# Patient Record
Sex: MALE | Race: WHITE | NOT HISPANIC OR LATINO | Employment: FULL TIME | URBAN - METROPOLITAN AREA
[De-identification: names, ages, dates, MRNs, and addresses within clinical notes are randomized per-mention and may not be internally consistent; named-entity substitution may affect disease eponyms.]

---

## 2018-12-11 ENCOUNTER — OFFICE VISIT CONVERTED (OUTPATIENT)
Dept: CARDIOLOGY | Facility: CLINIC | Age: 53
End: 2018-12-11
Attending: INTERNAL MEDICINE

## 2019-02-25 ENCOUNTER — OFFICE VISIT CONVERTED (OUTPATIENT)
Dept: SURGERY | Facility: CLINIC | Age: 54
End: 2019-02-25
Attending: UROLOGY

## 2019-02-25 ENCOUNTER — CONVERSION ENCOUNTER (OUTPATIENT)
Dept: SURGERY | Facility: CLINIC | Age: 54
End: 2019-02-25

## 2020-07-02 ENCOUNTER — OFFICE VISIT CONVERTED (OUTPATIENT)
Dept: CARDIOLOGY | Facility: CLINIC | Age: 55
End: 2020-07-02
Attending: INTERNAL MEDICINE

## 2021-05-13 NOTE — PROGRESS NOTES
"   Progress Note      Patient Name: Jean Carlos Scruggs   Patient ID: [id number]   Sex: Male   YOB: 1965    Primary Care Provider: Abdiaziz Lord Jr. MD   Referring Provider: Maxine UGALDE    Visit Date: July 2, 2020    Provider: Toño Pillai MD   Location: [facility]   Location Address: [address] [place] [address], [facility]   [place]  [id number]   Location Phone: [phone]          Chief Complaint  · Hypertension   · Diabetes   · Aortic aneurysm       History Of Present Illness  REFERRING CARE PROVIDER: Maxine UGALDE   Jean Carlos Scruggs is a 55 year old /White gentleman with a history of hypertension, diabetes and small aortic aneurysm, who is following up today. No complaints or problems.   PAST MEDICAL HISTORY: Significant for diabetes and hypertension.   FAMILY HISTORY: Positive for diabetes, hypertension and heart disease.   PSYCHOSOCIAL HISTORY: No history of mood changes or depression. He never used alcohol. He previously used tobacco but quit.   CURRENT MEDICATIONS: include Metformin 500 mg daily; Cetirizine 10 mg daily; ASA 81 mg daily; fish oil; HCTZ 12.5 mg daily; Losartan 50 mg daily. The dosage and frequency of the medications were reviewed with the patient.       Review of Systems  · Cardiovascular  o Denies  o : palpitations (fast, fluttering, or skipping beats), swelling (feet, ankles, hands), shortness of breath while walking or lying flat, chest pain or angina pectoris   · Respiratory  o Denies  o : chronic or frequent cough, asthma or wheezing      Vitals  Date Time BP Position Site L\R Cuff Size HR RR TEMP (F) WT  HT  BMI kg/m2 BSA m2 O2 Sat        07/02/2020 08:50 /72 Sitting    60 - R   192lbs 0oz 5'  9\" 28.35 2.06           Physical Examination  · Constitutional  o Appearance  o : Awake, alert, in no acute distress.   · Eyes  o Conjunctivae  o : Normal.  · Ears, Nose, Mouth and Throat  o Oral Cavity  o :   § Oral Mucosa  § : " Normal.  · Neck  o Inspection/Palpation  o : No JVD. Good carotid upstroke. No thyromegaly.  · Respiratory  o Respiratory  o : Good respiratory effort. Clear to percussion and auscultation.  · Cardiovascular  o Heart  o :   § Auscultation of Heart  § : S1, S2 normal. Regular rate and rhythm without murmurs, gallops, or rubs.  o Peripheral Vascular System  o :   § Extremities  § : Good femoral and pedal pulses. No pedal edema.  · Gastrointestinal  o Abdominal Examination  o : Soft. No tenderness or masses felt. No hepatosplenomegaly. Abdominal aorta is not palpable.          Assessment     ASSESSMENT AND PLAN:    1.  Hypertension controlled.  2.  Aortic aneurysm:  Measuring 4.3 cm on last CT.  Plan on repeating next year to evaluate for any changes or       not.    MD Ariella Padron/merlin         This note was transcribed by Stacie Jacobson.  merlin/ariella   The above service was transcribed by Stacie Jacobson, and I attest to the accuracy of the note.  .               Electronically Signed by: Stacie Jacobson-, -Author on July 7, 2020 07:12:44 AM  Electronically Co-signed by: Toño Pillai MD -Reviewer on July 14, 2020 02:14:28 PM

## 2021-05-15 VITALS
DIASTOLIC BLOOD PRESSURE: 72 MMHG | HEIGHT: 69 IN | HEART RATE: 60 BPM | BODY MASS INDEX: 28.44 KG/M2 | WEIGHT: 192 LBS | SYSTOLIC BLOOD PRESSURE: 118 MMHG

## 2021-05-16 VITALS
HEIGHT: 69 IN | HEART RATE: 54 BPM | BODY MASS INDEX: 27.11 KG/M2 | SYSTOLIC BLOOD PRESSURE: 104 MMHG | DIASTOLIC BLOOD PRESSURE: 70 MMHG | WEIGHT: 183 LBS

## 2021-05-16 VITALS — HEIGHT: 67 IN | BODY MASS INDEX: 27.47 KG/M2 | RESPIRATION RATE: 16 BRPM | WEIGHT: 175 LBS

## 2021-05-22 ENCOUNTER — TRANSCRIBE ORDERS (OUTPATIENT)
Dept: ADMINISTRATIVE | Facility: HOSPITAL | Age: 56
End: 2021-05-22

## 2021-05-22 DIAGNOSIS — I71.9 AORTIC ANEURYSM WITHOUT RUPTURE, UNSPECIFIED PORTION OF AORTA (HCC): Primary | ICD-10-CM

## 2021-06-29 DIAGNOSIS — I71.20 THORACIC AORTIC ANEURYSM WITHOUT RUPTURE (HCC): Primary | ICD-10-CM

## 2021-07-01 ENCOUNTER — APPOINTMENT (OUTPATIENT)
Dept: CT IMAGING | Facility: HOSPITAL | Age: 56
End: 2021-07-01

## 2021-07-27 ENCOUNTER — TELEPHONE (OUTPATIENT)
Dept: CARDIOLOGY | Facility: CLINIC | Age: 56
End: 2021-07-27

## 2021-07-27 NOTE — TELEPHONE ENCOUNTER
Palliative Care Patient stated that he has not had the his CT of the chest done yet and has been trying to call scheduling to get it done.

## 2021-08-16 ENCOUNTER — HOSPITAL ENCOUNTER (OUTPATIENT)
Dept: CT IMAGING | Facility: HOSPITAL | Age: 56
Discharge: HOME | End: 2021-08-16
Admitting: NURSE PRACTITIONER

## 2021-08-16 DIAGNOSIS — I71.20 THORACIC AORTIC ANEURYSM WITHOUT RUPTURE (HCC): ICD-10-CM

## 2021-08-16 LAB — CREAT BLDA-MCNC: 1.2 MG/DL

## 2021-08-16 PROCEDURE — 71260 CT THORAX DX C+: CPT

## 2021-08-16 PROCEDURE — 0 IOPAMIDOL PER 1 ML: Performed by: NURSE PRACTITIONER

## 2021-08-16 PROCEDURE — 82565 ASSAY OF CREATININE: CPT

## 2021-08-16 RX ADMIN — IOPAMIDOL 100 ML: 755 INJECTION, SOLUTION INTRAVENOUS at 17:45

## 2021-08-20 ENCOUNTER — TELEPHONE (OUTPATIENT)
Dept: CARDIOLOGY | Facility: CLINIC | Age: 56
End: 2021-08-20

## 2021-08-20 NOTE — TELEPHONE ENCOUNTER
----- Message from AFTAB Dudley sent at 8/16/2021  8:30 PM EDT -----  Notify or aneurysm is stable, now 4.6 cm. Repeat CT in one year

## 2022-08-20 PROBLEM — I71.20 THORACIC AORTIC ANEURYSM WITHOUT RUPTURE (HCC): Status: ACTIVE | Noted: 2022-08-20

## 2022-08-20 PROBLEM — I10 HYPERTENSION: Status: ACTIVE | Noted: 2022-08-20

## 2022-08-23 ENCOUNTER — OFFICE VISIT (OUTPATIENT)
Dept: CARDIOLOGY | Facility: CLINIC | Age: 57
End: 2022-08-23

## 2022-08-23 VITALS
BODY MASS INDEX: 27.88 KG/M2 | DIASTOLIC BLOOD PRESSURE: 79 MMHG | HEART RATE: 74 BPM | WEIGHT: 188.2 LBS | HEIGHT: 69 IN | SYSTOLIC BLOOD PRESSURE: 133 MMHG

## 2022-08-23 DIAGNOSIS — I10 PRIMARY HYPERTENSION: ICD-10-CM

## 2022-08-23 DIAGNOSIS — I71.20 THORACIC AORTIC ANEURYSM WITHOUT RUPTURE: Primary | ICD-10-CM

## 2022-08-23 PROCEDURE — 99214 OFFICE O/P EST MOD 30 MIN: CPT | Performed by: INTERNAL MEDICINE

## 2022-08-23 RX ORDER — CETIRIZINE HYDROCHLORIDE 10 MG/1
10 TABLET ORAL DAILY
COMMUNITY

## 2022-08-23 RX ORDER — MULTIVITAMIN WITH IRON
250 TABLET ORAL DAILY
COMMUNITY

## 2022-08-23 RX ORDER — LOSARTAN POTASSIUM AND HYDROCHLOROTHIAZIDE 12.5; 5 MG/1; MG/1
1 TABLET ORAL DAILY
COMMUNITY

## 2022-08-23 NOTE — ASSESSMENT & PLAN NOTE
Blood pressure well controlled at goal range continue losartan HCTZ 50/12.5 once a day and counseled on a low-sodium diet

## 2022-08-23 NOTE — ASSESSMENT & PLAN NOTE
Blood pressure well controlled at home we will repeat CT scan to evaluate change in size of aneurysm

## 2022-08-23 NOTE — PROGRESS NOTES
"Chief Complaint  Hypertension    Subjective    Patient symptomatically stable during his stay blood pressures run in the 120s at home.  Denies any chest pain or shortness of breath problems    Past Medical History:   Diagnosis Date   • Diabetes (HCC)    • Hypertension          Current Outpatient Medications:   •  cetirizine (zyrTEC) 10 MG tablet, Take 10 mg by mouth Daily., Disp: , Rfl:   •  losartan-hydrochlorothiazide (HYZAAR) 50-12.5 MG per tablet, Take 1 tablet by mouth Daily., Disp: , Rfl:   •  Magnesium 250 MG tablet, Take 250 mg by mouth Daily., Disp: , Rfl:   •  metFORMIN (GLUCOPHAGE) 500 MG tablet, Take 500 mg by mouth Daily., Disp: , Rfl:     There are no discontinued medications.  No Known Allergies     Social History     Tobacco Use   • Smoking status: Never Smoker   • Smokeless tobacco: Current User     Types: Chew   Substance Use Topics   • Alcohol use: Never   • Drug use: Never       Family History   Problem Relation Age of Onset   • Diabetes Other    • Heart disease Other         Objective     /79   Pulse 74   Ht 175.3 cm (69\")   Wt 85.4 kg (188 lb 3.2 oz)   BMI 27.79 kg/m²       Physical Exam    General Appearance:   · no acute distress  · Alert and oriented x3  HENT:   · lips not cyanotic  · Atraumatic  Neck:  · No jvd   · supple  Respiratory:  · no respiratory distress  · normal breath sounds  · no rales  Cardiovascular:  · Regular rate and rhythm  · no S3, no S4   · no murmur  · no rub  Extremities  · No cyanosis  · lower extremity edema: none    Skin:   · warm, dry  · No rashes      Result Review :     No results found for: PROBNP                        Diagnoses and all orders for this visit:    1. Thoracic aortic aneurysm without rupture (HCC) (Primary)  Assessment & Plan:  Blood pressure well controlled at home we will repeat CT scan to evaluate change in size of aneurysm    Orders:  -     CT Chest Without Contrast Diagnostic; Future    2. Primary hypertension  Assessment & " Plan:  Blood pressure well controlled at goal range continue losartan HCTZ 50/12.5 once a day and counseled on a low-sodium diet    Orders:  -     Basic Metabolic Panel; Future          Follow Up     Return in about 58 weeks (around 10/3/2023) for Follow with Ashanti Mann.          Patient was given instructions and counseling regarding his condition or for health maintenance advice. Please see specific information pulled into the AVS if appropriate.

## 2022-09-23 ENCOUNTER — HOSPITAL ENCOUNTER (OUTPATIENT)
Dept: CT IMAGING | Facility: HOSPITAL | Age: 57
Discharge: HOME | End: 2022-09-23
Admitting: INTERNAL MEDICINE

## 2022-09-23 DIAGNOSIS — I71.20 THORACIC AORTIC ANEURYSM WITHOUT RUPTURE: ICD-10-CM

## 2022-09-23 PROCEDURE — 71250 CT THORAX DX C-: CPT

## 2022-09-26 ENCOUNTER — TELEPHONE (OUTPATIENT)
Dept: CARDIOLOGY | Facility: CLINIC | Age: 57
End: 2022-09-26

## 2022-09-26 NOTE — TELEPHONE ENCOUNTER
Informed pt with results and plan. Tried to call his PCP for fax #. They are closed at this time.

## 2022-09-26 NOTE — TELEPHONE ENCOUNTER
----- Message from AFTAB Dudley sent at 9/26/2022 12:32 AM EDT -----  Notify pt CT Chest shows stable aortic measurements, continue to monitor with repeat scan in 1 year. Please forward CT result to PCP for the noncardiac findings so they can be appropriately addressed

## 2024-02-01 ENCOUNTER — OFFICE VISIT (OUTPATIENT)
Dept: CARDIOLOGY | Facility: CLINIC | Age: 59
End: 2024-02-01
Payer: COMMERCIAL

## 2024-02-01 VITALS
DIASTOLIC BLOOD PRESSURE: 75 MMHG | SYSTOLIC BLOOD PRESSURE: 112 MMHG | HEART RATE: 80 BPM | WEIGHT: 194 LBS | BODY MASS INDEX: 28.73 KG/M2 | HEIGHT: 69 IN

## 2024-02-01 DIAGNOSIS — I10 PRIMARY HYPERTENSION: Primary | ICD-10-CM

## 2024-02-01 DIAGNOSIS — I71.21 ANEURYSM OF ASCENDING AORTA WITHOUT RUPTURE: ICD-10-CM

## 2024-02-01 PROBLEM — E11.9 DIABETES: Status: ACTIVE | Noted: 2024-02-01

## 2024-02-01 RX ORDER — TERBINAFINE HYDROCHLORIDE 250 MG/1
TABLET ORAL
COMMUNITY
Start: 2024-01-30

## 2024-02-01 RX ORDER — CYCLOBENZAPRINE HCL 10 MG
10 TABLET ORAL
COMMUNITY
Start: 2023-10-31

## 2024-02-01 RX ORDER — MELOXICAM 15 MG/1
15 TABLET ORAL
COMMUNITY
Start: 2023-11-15

## 2024-02-01 NOTE — PROGRESS NOTES
"Chief Complaint  Follow-up    Subjective            History of Present Illness  Jean Carlos Scruggs is a 58-year-old male patient who presents to the office today for follow-up.  He has hypertension and ascending aortic aneurysm.  He is compliant with medication.  He denies any chest pain, shortness of breath, lightheadedness/dizziness, palpitations, or edema.    PMH  Past Medical History:   Diagnosis Date    Diabetes     Hypertension          ALLERGY  No Known Allergies       SURGICALHX  Past Surgical History:   Procedure Laterality Date    TONSILLECTOMY      VASECTOMY      VASECTOMY REVERSAL            SOC  Social History     Socioeconomic History    Marital status:    Tobacco Use    Smoking status: Never    Smokeless tobacco: Current     Types: Chew   Vaping Use    Vaping Use: Never used   Substance and Sexual Activity    Alcohol use: Never    Drug use: Never         FAMHX  Family History   Problem Relation Age of Onset    Diabetes Other     Heart disease Other           MEDSIGONLY  Current Outpatient Medications on File Prior to Visit   Medication Sig    cetirizine (zyrTEC) 10 MG tablet Take 1 tablet by mouth Daily.    cyclobenzaprine (FLEXERIL) 10 MG tablet 1 tablet.    losartan-hydrochlorothiazide (HYZAAR) 50-12.5 MG per tablet Take 1 tablet by mouth Daily.    Magnesium 250 MG tablet Take 1 tablet by mouth Daily.    meloxicam (MOBIC) 15 MG tablet 1 tablet.    metFORMIN (GLUCOPHAGE) 500 MG tablet Take 1 tablet by mouth Daily.    terbinafine (lamiSIL) 250 MG tablet      No current facility-administered medications on file prior to visit.         Objective   /75   Pulse 80   Ht 175.3 cm (69\")   Wt 88 kg (194 lb)   BMI 28.65 kg/m²       Physical Exam  HENT:      Head: Normocephalic.   Neck:      Vascular: No carotid bruit.   Cardiovascular:      Rate and Rhythm: Normal rate and regular rhythm.      Pulses: Normal pulses.      Heart sounds: Normal heart sounds. No murmur heard.  Pulmonary:      " "Effort: Pulmonary effort is normal.      Breath sounds: Normal breath sounds.   Musculoskeletal:      Cervical back: Neck supple.      Right lower leg: No edema.      Left lower leg: No edema.   Skin:     General: Skin is dry.   Neurological:      Mental Status: He is alert and oriented to person, place, and time.   Psychiatric:         Behavior: Behavior normal.       Result Review :   The following data was reviewed by: AFTAB Roche on 02/01/2024:  No results found for: \"PROBNP\"     No results found for: \"TSH\"   No results found for: \"FREET4\"   No results found for: \"DDIMERQUANT\"  No results found for: \"MG\"   No results found for: \"DIGOXIN\"   No results found for: \"TROPONINT\"               Assessment and Plan    Diagnoses and all orders for this visit:    1. Primary hypertension (Primary)  Currently controlled and without adverse effects from medication, continue losartan/HCTZ 50-12.5 mg daily.  Check BMP and magnesium level to assess electrolytes and renal function.  Check fasting lipid and hepatic function panel to assess need for statin therapy.  -     Basic Metabolic Panel; Future  -     Lipid Panel; Future  -     Hepatic Function Panel; Future  -     Magnesium; Future    2. Aneurysm of ascending aorta without rupture  Last CT chest was 9/23/2022 with ascending thoracic aorta measuring 3.6 cm.  Repeat CT chest September 2024 for further monitoring.  -     CT Chest Without Contrast; Future            Follow Up   Return in about 1 year (around 2/1/2025) for Follow up with Dr Pillai.    Patient was given instructions and counseling regarding his condition or for health maintenance advice. Please see specific information pulled into the AVS if appropriate.     Jean Carlos Scruggs  reports that he has never smoked. His smokeless tobacco use includes chew.           AFTAB Roche  02/01/24  15:11 EST    Dictated Utilizing Dragon Dictation    "

## 2024-08-08 ENCOUNTER — OFFICE VISIT (OUTPATIENT)
Dept: CARDIOLOGY | Facility: CLINIC | Age: 59
End: 2024-08-08
Payer: COMMERCIAL

## 2024-08-08 VITALS
HEART RATE: 73 BPM | WEIGHT: 190 LBS | HEIGHT: 69 IN | DIASTOLIC BLOOD PRESSURE: 64 MMHG | BODY MASS INDEX: 28.14 KG/M2 | SYSTOLIC BLOOD PRESSURE: 112 MMHG

## 2024-08-08 DIAGNOSIS — I10 PRIMARY HYPERTENSION: ICD-10-CM

## 2024-08-08 DIAGNOSIS — I71.21 ANEURYSM OF ASCENDING AORTA WITHOUT RUPTURE: Primary | ICD-10-CM

## 2024-08-08 PROCEDURE — 99213 OFFICE O/P EST LOW 20 MIN: CPT | Performed by: INTERNAL MEDICINE

## 2024-08-08 NOTE — PROGRESS NOTES
"Chief Complaint  Follow-up, Hypertension, and Thoracic aortic aneurysm without rupture    Subjective    Patient with no chest pain problems denies any change in breathing ability  Past Medical History:   Diagnosis Date    Diabetes     Hypertension          Current Outpatient Medications:     cetirizine (zyrTEC) 10 MG tablet, Take 1 tablet by mouth Daily., Disp: , Rfl:     cyclobenzaprine (FLEXERIL) 10 MG tablet, 1 tablet., Disp: , Rfl:     losartan-hydrochlorothiazide (HYZAAR) 50-12.5 MG per tablet, Take 1 tablet by mouth Daily., Disp: , Rfl:     Magnesium 250 MG tablet, Take 1 tablet by mouth Daily., Disp: , Rfl:     meloxicam (MOBIC) 15 MG tablet, 1 tablet., Disp: , Rfl:     metFORMIN (GLUCOPHAGE) 500 MG tablet, Take 1 tablet by mouth Daily., Disp: , Rfl:     terbinafine (lamiSIL) 250 MG tablet, , Disp: , Rfl:     There are no discontinued medications.  No Known Allergies     Social History     Tobacco Use    Smoking status: Never    Smokeless tobacco: Current     Types: Chew   Vaping Use    Vaping status: Never Used   Substance Use Topics    Alcohol use: Never    Drug use: Never       Family History   Problem Relation Age of Onset    Diabetes Other     Heart disease Other         Objective     /64   Pulse 73   Ht 175.3 cm (69\")   Wt 86.2 kg (190 lb)   BMI 28.06 kg/m²       Physical Exam    General Appearance:   no acute distress  Alert and oriented x3  HENT:   lips not cyanotic  Atraumatic  Neck:  No jvd   supple  Respiratory:  no respiratory distress  normal breath sounds  no rales  Cardiovascular:  Regular rate and rhythm  no S3, no S4   no murmur  no rub  Extremities  No cyanosis  lower extremity edema: none    Skin:   warm, dry  No rashes      Result Review :     No results found for: \"PROBNP\"       No results found for: \"TSH\"   No results found for: \"FREET4\"   No results found for: \"DDIMERQUANT\"  No results found for: \"MG\"   No results found for: \"DIGOXIN\"   No results found for: \"TROPONINT\"      " "    No results found for: \"POCTROP\"                   Diagnoses and all orders for this visit:    1. Aneurysm of ascending aorta without rupture (Primary)  Assessment & Plan:  Repeat CT scan for serial monitoring in 1 year    Orders:  -     CT Chest Without Contrast Diagnostic; Future    2. Primary hypertension  Assessment & Plan:  Blood pressure at goal range on losartan/HCTZ 50/12.5 mg once a day               Follow Up     Return in about 1 year (around 8/8/2025) for Follow with Ashanti Mann.          Patient was given instructions and counseling regarding his condition or for health maintenance advice. Please see specific information pulled into the AVS if appropriate.       "

## 2025-08-04 ENCOUNTER — HOSPITAL ENCOUNTER (OUTPATIENT)
Dept: CT IMAGING | Facility: HOSPITAL | Age: 60
Discharge: HOME | End: 2025-08-04
Admitting: INTERNAL MEDICINE
Payer: COMMERCIAL

## 2025-08-04 DIAGNOSIS — I71.21 ANEURYSM OF ASCENDING AORTA WITHOUT RUPTURE: ICD-10-CM

## 2025-08-04 PROCEDURE — 71250 CT THORAX DX C-: CPT

## 2025-08-11 ENCOUNTER — RESULTS FOLLOW-UP (OUTPATIENT)
Dept: CARDIOLOGY | Facility: CLINIC | Age: 60
End: 2025-08-11
Payer: COMMERCIAL

## 2025-08-11 DIAGNOSIS — I71.21 ANEURYSM OF ASCENDING AORTA WITHOUT RUPTURE: Primary | ICD-10-CM
